# Patient Record
Sex: FEMALE | Race: WHITE | NOT HISPANIC OR LATINO | Employment: OTHER | URBAN - METROPOLITAN AREA
[De-identification: names, ages, dates, MRNs, and addresses within clinical notes are randomized per-mention and may not be internally consistent; named-entity substitution may affect disease eponyms.]

---

## 2023-07-24 LAB
GRAM STAIN: ABNORMAL
TISSUE/WOUND CULTURE/SMEAR: ABNORMAL
TISSUE/WOUND CULTURE/SMEAR: ABNORMAL

## 2023-09-07 PROBLEM — L60.0 ONYCHOCRYPTOSIS: Status: ACTIVE | Noted: 2023-09-07

## 2023-09-07 PROBLEM — S90.31XA CONTUSION OF RIGHT FOOT: Status: ACTIVE | Noted: 2023-09-07

## 2023-09-07 PROBLEM — R60.0 EDEMA OF FOOT: Status: ACTIVE | Noted: 2023-09-07

## 2023-09-07 PROBLEM — J45.909 ASTHMA (HHS-HCC): Status: ACTIVE | Noted: 2023-09-07

## 2023-09-07 PROBLEM — G62.9 PERIPHERAL NEUROPATHY: Status: ACTIVE | Noted: 2023-09-07

## 2023-09-07 PROBLEM — S90.32XA TRAUMATIC ECCHYMOSIS OF LEFT FOOT: Status: ACTIVE | Noted: 2023-09-07

## 2023-09-07 PROBLEM — M20.10 VALGUS DEFORMITY OF GREAT TOE: Status: ACTIVE | Noted: 2023-09-07

## 2023-09-07 PROBLEM — M79.671 FOOT PAIN, BILATERAL: Status: ACTIVE | Noted: 2023-09-07

## 2023-09-07 PROBLEM — B35.1 ONYCHOMYCOSIS: Status: ACTIVE | Noted: 2023-09-07

## 2023-09-07 PROBLEM — L03.119 CELLULITIS OF FOOT: Status: ACTIVE | Noted: 2023-09-07

## 2023-09-07 PROBLEM — L30.9 ECZEMA: Status: ACTIVE | Noted: 2023-09-07

## 2023-09-07 PROBLEM — L29.9 PRURITUS: Status: ACTIVE | Noted: 2023-09-07

## 2023-09-07 PROBLEM — I10 BENIGN ESSENTIAL HTN: Status: ACTIVE | Noted: 2023-09-07

## 2023-09-07 PROBLEM — L84 CORNS: Status: ACTIVE | Noted: 2023-09-07

## 2023-09-07 PROBLEM — K59.09 CHRONIC CONSTIPATION: Status: ACTIVE | Noted: 2023-09-07

## 2023-09-07 PROBLEM — M79.672 FOOT PAIN, BILATERAL: Status: ACTIVE | Noted: 2023-09-07

## 2023-09-07 RX ORDER — METOPROLOL TARTRATE 25 MG/1
1 TABLET, FILM COATED ORAL DAILY
COMMUNITY
Start: 2017-10-12

## 2023-09-07 RX ORDER — CEPHALEXIN 500 MG/1
500 CAPSULE ORAL 4 TIMES DAILY
COMMUNITY
End: 2023-09-08 | Stop reason: ENTERED-IN-ERROR

## 2023-09-07 RX ORDER — CLOBETASOL PROPIONATE 0.5 MG/G
OINTMENT TOPICAL 2 TIMES DAILY
COMMUNITY

## 2023-09-07 RX ORDER — AMLODIPINE BESYLATE 10 MG/1
TABLET ORAL
COMMUNITY
Start: 2018-12-03

## 2023-09-07 RX ORDER — MONTELUKAST SODIUM 10 MG/1
10 TABLET ORAL
COMMUNITY

## 2023-09-07 RX ORDER — TRIAMCINOLONE ACETONIDE 1 MG/G
CREAM TOPICAL
COMMUNITY
Start: 2023-07-12

## 2023-09-07 RX ORDER — ALBUTEROL SULFATE 90 UG/1
2 AEROSOL, METERED RESPIRATORY (INHALATION) EVERY 6 HOURS PRN
COMMUNITY

## 2023-09-07 RX ORDER — FLUTICASONE PROPIONATE AND SALMETEROL 500; 50 UG/1; UG/1
1 POWDER RESPIRATORY (INHALATION) 2 TIMES DAILY
COMMUNITY
Start: 2017-08-10

## 2023-09-07 RX ORDER — ATORVASTATIN CALCIUM 10 MG/1
10 TABLET, FILM COATED ORAL
COMMUNITY
Start: 2023-05-16

## 2023-09-07 RX ORDER — AMLODIPINE BESYLATE 5 MG/1
5 TABLET ORAL
COMMUNITY
Start: 2023-05-16

## 2023-09-07 RX ORDER — BENRALIZUMAB 30 MG/ML
INJECTION, SOLUTION SUBCUTANEOUS
COMMUNITY

## 2023-10-11 ENCOUNTER — OFFICE VISIT (OUTPATIENT)
Dept: PODIATRY | Facility: CLINIC | Age: 83
End: 2023-10-11
Payer: MEDICARE

## 2023-10-11 DIAGNOSIS — M79.671 PAIN IN BOTH FEET: Primary | ICD-10-CM

## 2023-10-11 DIAGNOSIS — M79.672 PAIN IN BOTH FEET: Primary | ICD-10-CM

## 2023-10-11 DIAGNOSIS — L60.0 INGROWN TOENAIL: ICD-10-CM

## 2023-10-11 DIAGNOSIS — M20.11 VALGUS DEFORMITY OF BOTH GREAT TOES: ICD-10-CM

## 2023-10-11 DIAGNOSIS — M20.12 VALGUS DEFORMITY OF BOTH GREAT TOES: ICD-10-CM

## 2023-10-11 DIAGNOSIS — B35.1 ONYCHOMYCOSIS: ICD-10-CM

## 2023-10-11 DIAGNOSIS — R26.89 ANTALGIC GAIT: ICD-10-CM

## 2023-10-11 PROCEDURE — 1159F MED LIST DOCD IN RCRD: CPT | Performed by: PODIATRIST

## 2023-10-11 PROCEDURE — 99213 OFFICE O/P EST LOW 20 MIN: CPT | Performed by: PODIATRIST

## 2023-10-11 PROCEDURE — 1160F RVW MEDS BY RX/DR IN RCRD: CPT | Performed by: PODIATRIST

## 2023-10-11 NOTE — PATIENT INSTRUCTIONS
Recommend wide shoes please consider getting something at [employer] that has a location [facility]

## 2023-10-11 NOTE — PROGRESS NOTES
Chief Complaint   Patient presents with    nail care     FARIDEH            Patient has the chief complaint of painful nails on both feet.  There is a history of either ingrown nails or other problems with the nails. This includes difficulty with ambulation and wearing shoes.  Previous ingrown nail is doing much better.  She does complain of bilateral foot pain secondary to hallux valgus deformity.  There are difficulty finding wide with shoes.  Also mild corn between first and second digits of the right foot. There are no other complaints at this time. No changes in past medical history.  ROS without change from prior.      General/Constitutional: Alert. NAD.   Respiratory: Non labored breathing.   Psychiatric: Mood and affect normal/baseline.   HEENT: Sclera clear. Wearing corrective lenses.  Dermatologic: Nails are hypertrophic crumbly and yellow.  Mild residual onychocryptosis with hallux nail painful to palpation. No acute inflammatory infectious process. Web spaces are dry. No ulcers no pre-ulcerative areas.  Mild soft corn between first and second digits right foot.  Vascular: Pedal pulses are intact and symmetric including the dorsalis pedis and the posterior tibial pulses. Feet are warm to touch.  Neck nonpitting edema appreciated..  Neurological: Alert and oriented. No acute distress. No sensory impairment bilateral.  Musculoskeletal: Strength is normal for age. No acute deficits appreciated.  Significant hallux valgus bilaterally.       Impression: Hallux valgus deformity bilaterally. Painful nail pathology as described.  Resolved contusion left foot.     -Discussed proper shoe gear.  Discussed interdigital padding between first and second digits of the right foot.  Soft padding discussed.     -Wider shoe gear recommended.  Information given regarding work issues.     -Today's treatment and course of therapy was discussed with the patient in detail. Patient's questions were answered. Proper foot care was  discussed. This dictation was done using Dragon computer software and as such may contain grammatical errors.     -Nail debridement was performed this was a greater than 6 nails. Nails were manually debrided. They were decreased and girth in length. Appropriate care was discussed. Preventative care was reviewed. Follow-up in about 2 months unless there is any other problems.

## 2023-10-25 ENCOUNTER — HOSPITAL ENCOUNTER (OUTPATIENT)
Dept: RADIOLOGY | Facility: HOSPITAL | Age: 83
Discharge: HOME | End: 2023-10-25
Payer: MEDICARE

## 2023-10-25 DIAGNOSIS — R06.02 SHORTNESS OF BREATH: ICD-10-CM

## 2023-10-25 PROCEDURE — 71046 X-RAY EXAM CHEST 2 VIEWS: CPT | Performed by: RADIOLOGY

## 2023-10-25 PROCEDURE — 71046 X-RAY EXAM CHEST 2 VIEWS: CPT | Mod: FY

## 2023-12-13 ENCOUNTER — OFFICE VISIT (OUTPATIENT)
Dept: PODIATRY | Facility: CLINIC | Age: 83
End: 2023-12-13
Payer: MEDICARE

## 2023-12-13 DIAGNOSIS — M20.11 VALGUS DEFORMITY OF BOTH GREAT TOES: ICD-10-CM

## 2023-12-13 DIAGNOSIS — M20.12 VALGUS DEFORMITY OF BOTH GREAT TOES: ICD-10-CM

## 2023-12-13 DIAGNOSIS — M79.671 PAIN IN BOTH FEET: Primary | ICD-10-CM

## 2023-12-13 DIAGNOSIS — L60.0 INGROWN TOENAIL: ICD-10-CM

## 2023-12-13 DIAGNOSIS — B35.1 ONYCHOMYCOSIS: ICD-10-CM

## 2023-12-13 DIAGNOSIS — R26.89 ANTALGIC GAIT: ICD-10-CM

## 2023-12-13 DIAGNOSIS — M79.672 PAIN IN BOTH FEET: Primary | ICD-10-CM

## 2023-12-13 PROCEDURE — 1159F MED LIST DOCD IN RCRD: CPT | Performed by: PODIATRIST

## 2023-12-13 PROCEDURE — 99213 OFFICE O/P EST LOW 20 MIN: CPT | Performed by: PODIATRIST

## 2023-12-13 PROCEDURE — 1160F RVW MEDS BY RX/DR IN RCRD: CPT | Performed by: PODIATRIST

## 2023-12-13 NOTE — PROGRESS NOTES
Chief Complaint   Patient presents with    nail care     FARIDEH      Patient has the chief complaint of painful nails on both feet.  There is a history of either ingrown nails or other problems with the nails. This includes difficulty with ambulation and wearing shoes.  Additionally she complains of a pressure between the first and second digits of the right foot.  Significant hallux valgus deformity.  Has not gotten wider shoes yet she describes forefoot discomfort because of shoe gear.  No changes past medical history review of systems.    General/Constitutional: Alert. NAD.   Respiratory: Non labored breathing.   Psychiatric: Mood and affect normal/baseline.   HEENT: Sclera clear. Wearing corrective lenses.  Dermatologic: Nails are hypertrophic crumbly and yellow.  Mild residual onychocryptosis with hallux nail painful to palpation. No acute inflammatory infectious process. Web spaces are dry. No ulcers no pre-ulcerative areas.  Mild soft corn between first and second digits right foot.  This is without any evidence of infection.  Vascular: Pedal pulses are intact and symmetric including the dorsalis pedis and the posterior tibial pulses. Feet are warm to touch.  Chronic nonpitting edema appreciated.  Neurological: Alert and oriented. No acute distress. No sensory impairment bilateral.  Musculoskeletal: Strength is normal for age. No acute deficits appreciated.  Significant hallux valgus bilaterally.     Impression: Hallux valgus deformity bilaterally. Painful nail pathology as described.      -Okay to use padding between the first and second digits of the right foot.     -Still recommend wider shoe gear.  Please get Pap for yourself.    -Seasonal footcare discussed.     -Today's treatment and course of therapy was discussed with the patient in detail. Patient's questions were answered. Proper foot care was discussed. This dictation was done using Dragon computer software and as such may contain grammatical  errors.     -Nail debridement was performed this was a greater than 6 nails. Nails were manually debrided. They were decreased and girth in length. Appropriate care was discussed. Preventative care was reviewed. Follow-up in about 2 months unless there is any other problems.

## 2024-03-06 ENCOUNTER — PROCEDURE VISIT (OUTPATIENT)
Dept: PODIATRY | Facility: CLINIC | Age: 84
End: 2024-03-06
Payer: MEDICARE

## 2024-03-06 DIAGNOSIS — M20.12 VALGUS DEFORMITY OF BOTH GREAT TOES: ICD-10-CM

## 2024-03-06 DIAGNOSIS — M79.672 PAIN IN BOTH FEET: Primary | ICD-10-CM

## 2024-03-06 DIAGNOSIS — B35.1 ONYCHOMYCOSIS: ICD-10-CM

## 2024-03-06 DIAGNOSIS — L60.0 INGROWN TOENAIL: ICD-10-CM

## 2024-03-06 DIAGNOSIS — M79.671 PAIN IN BOTH FEET: Primary | ICD-10-CM

## 2024-03-06 DIAGNOSIS — M20.11 VALGUS DEFORMITY OF BOTH GREAT TOES: ICD-10-CM

## 2024-03-06 PROCEDURE — 99213 OFFICE O/P EST LOW 20 MIN: CPT | Performed by: PODIATRIST

## 2024-03-06 NOTE — PROGRESS NOTES
Chief Complaint   Patient presents with    nail care     Patient is here today for nail care      Patient has the chief complaint of painful nails on both feet.  There is a history of either ingrown nails or other problems with the nails. This includes difficulty with ambulation and wearing shoes.  Left hallux medial border feels ingrown.  Additionally she complains of a pressure between the first and second digits of the right foot.  This is baseline attempted to pad it.  Significant hallux valgus deformity.  He is now wearing wider shoes.  No changes past medical history review of systems.    General/Constitutional: Alert. NAD.   Respiratory: Non labored breathing.   Psychiatric: Mood and affect normal/baseline.   HEENT: Sclera clear. Wearing corrective lenses.  Dermatologic: Nails are hypertrophic crumbly and yellow.  Mild residual onychocryptosis with left hallux nail painful to palpation. No acute inflammatory infectious process. Web spaces are dry. No ulcers no pre-ulcerative areas.  Soft corn/stage I pressure area between first and second digits right foot.  This is without any evidence of infection.  Vascular: Pedal pulses are intact and symmetric including the dorsalis pedis and the posterior tibial pulses. Feet are warm to touch.  Chronic nonpitting edema appreciated.  Neurological: Alert and oriented. No acute distress. No sensory impairment bilateral.  Musculoskeletal: Strength is normal for age. No acute deficits appreciated.  Significant hallux valgus bilaterally.     Impression: Hallux valgus deformity bilaterally. Painful nail pathology as described.      -Continue wider shoe gear.  Protective padding.    -Seasonal footcare discussed.     -Today's treatment and course of therapy was discussed with the patient in detail. Patient's questions were answered. Proper foot care was discussed. This dictation was done using Dragon computer software and as such may contain grammatical errors.     -Nail  debridement was performed this was a greater than 6 nails. Nails were manually debrided. They were decreased and girth in length. Appropriate care was discussed. Preventative care was reviewed. Follow-up in about 2 months unless there is any other problems.    -Superficial paring ulcerated area second digit right foot.  Padding protect discussed.  Toe spacer with be beneficial.  Do the same in the left foot.

## 2024-05-08 ENCOUNTER — OFFICE VISIT (OUTPATIENT)
Dept: PODIATRY | Facility: CLINIC | Age: 84
End: 2024-05-08
Payer: MEDICARE

## 2024-05-08 DIAGNOSIS — M77.42 METATARSALGIA OF LEFT FOOT: ICD-10-CM

## 2024-05-08 DIAGNOSIS — M79.671 PAIN IN BOTH FEET: ICD-10-CM

## 2024-05-08 DIAGNOSIS — L84 CORNS AND CALLOSITIES: Primary | ICD-10-CM

## 2024-05-08 DIAGNOSIS — M20.41 HAMMERTOES OF BOTH FEET: ICD-10-CM

## 2024-05-08 DIAGNOSIS — M79.672 PAIN IN BOTH FEET: ICD-10-CM

## 2024-05-08 DIAGNOSIS — M20.42 HAMMERTOES OF BOTH FEET: ICD-10-CM

## 2024-05-08 PROCEDURE — 1160F RVW MEDS BY RX/DR IN RCRD: CPT | Performed by: PODIATRIST

## 2024-05-08 PROCEDURE — 1159F MED LIST DOCD IN RCRD: CPT | Performed by: PODIATRIST

## 2024-05-08 PROCEDURE — 99214 OFFICE O/P EST MOD 30 MIN: CPT | Performed by: PODIATRIST

## 2024-05-08 PROCEDURE — 1036F TOBACCO NON-USER: CPT | Performed by: PODIATRIST

## 2024-05-08 RX ORDER — FLUTICASONE FUROATE, UMECLIDINIUM BROMIDE AND VILANTEROL TRIFENATATE 200; 62.5; 25 UG/1; UG/1; UG/1
1 POWDER RESPIRATORY (INHALATION) DAILY
COMMUNITY
Start: 2024-04-10

## 2024-05-08 NOTE — PROGRESS NOTES
Chief Complaint   Patient presents with    corn     Patient is here today for a corn 2nd toe right foot. Started 2 weeks ago.        HPI: Patient is complaining of a painful callus on the second right digit.  She typically wears a gel spacer.  She says that the spacer slips out of the interspace.  Patient prefers the foam spacers we have in the office.  Patient is also complaining of pain on the ball of the foot of the left foot.  Denies any recent trauma.  Last seen by Dr. Singh 3/2024      Physical Exam  Patient alert, oriented, no acute distress     VASC: +2/4 DP B/L, no PT pulses B/L secondary to edema. CFT brisk all digits. Feet warm to touch. (+)hair growth B/L. Mild LE edema B/L and multiple varicosities.      NEURO: Vibratory absent L, diminished R 1st MPJ.  Light touch intact B/L.   5.07 [place]-Manny monofilament intact B/L.     DERM: Pre-ulcerative, painful, hyperkeratotic lesion located:medial 2nd R digit, lateral R hallux, medial PIPJ R hallux, medial 1st met head R. No cellulitis noted.     MUSCULOSKEL: +5/5 muscle strength B/L.   HAV noted B/L.  There is pain upon palpation on the plantar aspect of the 2nd and 3rd met heads L  Mild second interspace pain on palpation L   Hammertoes 2-5 B/L noted.   No pain with lateral compression of metatarsal heads L  No audible click noted L  Pes planus noted B/L    Assessment and plan  #1 calluses  Dispensed foam spacer  Paring of all hyperkeratotic tissue with a 15 blade without complications  Follow-up as needed    #2 metatarsalgia left foot  secondary to HAV and hammertoes  Rx:topical combination cream  Dispensed metatarsal pads  Follow-up as needed

## 2024-06-13 ENCOUNTER — HOSPITAL ENCOUNTER (OUTPATIENT)
Dept: RADIOLOGY | Facility: CLINIC | Age: 84
Discharge: HOME | End: 2024-06-13
Payer: MEDICARE

## 2024-06-13 VITALS — HEIGHT: 64 IN | WEIGHT: 190 LBS | BODY MASS INDEX: 32.44 KG/M2

## 2024-06-13 DIAGNOSIS — Z12.31 VISIT FOR SCREENING MAMMOGRAM: ICD-10-CM

## 2024-06-13 PROCEDURE — 77063 BREAST TOMOSYNTHESIS BI: CPT

## 2024-06-13 PROCEDURE — 77063 BREAST TOMOSYNTHESIS BI: CPT | Performed by: RADIOLOGY

## 2024-06-13 PROCEDURE — 77067 SCR MAMMO BI INCL CAD: CPT | Performed by: RADIOLOGY

## 2024-08-15 ENCOUNTER — OFFICE VISIT (OUTPATIENT)
Dept: PODIATRY | Facility: CLINIC | Age: 84
End: 2024-08-15
Payer: MEDICARE

## 2024-08-15 DIAGNOSIS — M79.671 PAIN IN BOTH FEET: ICD-10-CM

## 2024-08-15 DIAGNOSIS — M79.672 PAIN IN BOTH FEET: ICD-10-CM

## 2024-08-15 DIAGNOSIS — B35.1 ONYCHOMYCOSIS: ICD-10-CM

## 2024-08-15 DIAGNOSIS — L84 CORNS AND CALLOSITIES: Primary | ICD-10-CM

## 2024-08-15 PROCEDURE — 1159F MED LIST DOCD IN RCRD: CPT | Performed by: PODIATRIST

## 2024-08-15 PROCEDURE — 1160F RVW MEDS BY RX/DR IN RCRD: CPT | Performed by: PODIATRIST

## 2024-08-15 PROCEDURE — 1036F TOBACCO NON-USER: CPT | Performed by: PODIATRIST

## 2024-08-15 PROCEDURE — 99213 OFFICE O/P EST LOW 20 MIN: CPT | Performed by: PODIATRIST

## 2024-08-15 NOTE — PROGRESS NOTES
Chief Complaint   Patient presents with    nail care     Patient is here today for nail care and calluses        HPI: Patient is complaining of a painful calluses and painful nails that limit walking.  She is unable to reach her toes and trim her thick toenails herself.  She typically wears a gel spacer.  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  Her  has Parkinson's and her daughter is now on hospice.    Physical Exam  Patient alert, oriented, no acute distress     VASC: +2/4 DP B/L, no PT pulses B/L secondary to edema. CFT brisk all digits. Feet warm to touch. (+)hair growth B/L.  Moderate amount LE edema B/L and multiple varicosities.  He has increased since last visit     NEURO: Vibratory absent L, diminished R 1st MPJ.  Light touch intact B/L.   5.07 [place]-Manny monofilament intact B/L.     DERM: Pre-ulcerative, painful, hyperkeratotic lesion located:medial 2nd R digit, lateral R hallux, medial PIPJ R+L hallux, medial 1st met head R+L. No cellulitis noted.  Nails 1-5 bilaterally are thick, yellow, crumbly, painful and have subungual debris and elongated.     MUSCULOSKEL: +5/5 muscle strength B/L.   HAV noted B/L.  Pes planus noted B/L    Assessment and plan  #1 calluses  Continue with spacers  Paring of all hyperkeratotic tissue with a #15 blade without complications  Follow-up as needed    #2  Onychomycosis  Discussed findings in detail  Debrided all nails in length and thickness.  Patient to follow-up in 2 months  Proper footcare reviewed

## 2024-09-25 ENCOUNTER — HOSPITAL ENCOUNTER (OUTPATIENT)
Dept: RADIOLOGY | Facility: CLINIC | Age: 84
Discharge: HOME | End: 2024-09-25
Payer: MEDICARE

## 2024-09-25 VITALS — HEIGHT: 64 IN | WEIGHT: 190 LBS | BODY MASS INDEX: 32.44 KG/M2

## 2024-09-25 DIAGNOSIS — R92.8 FOLLOW-UP EXAMINATION OF ABNORMAL MAMMOGRAM: ICD-10-CM

## 2024-09-25 PROCEDURE — 76642 ULTRASOUND BREAST LIMITED: CPT | Mod: RIGHT SIDE | Performed by: RADIOLOGY

## 2024-09-25 PROCEDURE — 77065 DX MAMMO INCL CAD UNI: CPT | Mod: RIGHT SIDE | Performed by: RADIOLOGY

## 2024-09-25 PROCEDURE — 77061 BREAST TOMOSYNTHESIS UNI: CPT | Mod: RT

## 2024-09-25 PROCEDURE — 76642 ULTRASOUND BREAST LIMITED: CPT | Mod: RT

## 2024-09-25 PROCEDURE — 76982 USE 1ST TARGET LESION: CPT | Mod: RT

## 2024-09-25 PROCEDURE — G0279 TOMOSYNTHESIS, MAMMO: HCPCS | Mod: RIGHT SIDE | Performed by: RADIOLOGY

## 2025-03-17 ENCOUNTER — PROCEDURE VISIT (OUTPATIENT)
Dept: PODIATRY | Facility: CLINIC | Age: 85
End: 2025-03-17
Payer: MEDICARE

## 2025-03-17 DIAGNOSIS — M79.672 PAIN IN BOTH FEET: Primary | ICD-10-CM

## 2025-03-17 DIAGNOSIS — M20.42 HAMMERTOES OF BOTH FEET: ICD-10-CM

## 2025-03-17 DIAGNOSIS — B35.1 ONYCHOMYCOSIS: ICD-10-CM

## 2025-03-17 DIAGNOSIS — L84 CORNS AND CALLOSITIES: ICD-10-CM

## 2025-03-17 DIAGNOSIS — M79.671 PAIN IN BOTH FEET: Primary | ICD-10-CM

## 2025-03-17 DIAGNOSIS — M20.11 VALGUS DEFORMITY OF BOTH GREAT TOES: ICD-10-CM

## 2025-03-17 DIAGNOSIS — M20.41 HAMMERTOES OF BOTH FEET: ICD-10-CM

## 2025-03-17 DIAGNOSIS — M20.12 VALGUS DEFORMITY OF BOTH GREAT TOES: ICD-10-CM

## 2025-03-17 PROCEDURE — 99213 OFFICE O/P EST LOW 20 MIN: CPT | Performed by: PODIATRIST

## 2025-03-17 NOTE — PROGRESS NOTES
Chief Complaint   Patient presents with    Follow-up     NAIL CARE       Chief Complaint   Patient presents with    Follow-up     NAIL CARE      Patient has the chief complaint of painful nails on both feet.  There is a history of either ingrown nails or other problems with the nails. This includes difficulty with ambulation and wearing shoes.  At this time no more evidence of ingrown nail.  Pressure areas between the digits are improved.  Bunions are stable.  She has been wearing wider shoe gear.  She recently saw allergy medicine for persistent severe asthma..  No changes past medical history review of systems.    General/Constitutional: Alert. NAD.   Respiratory: Non labored breathing.   Psychiatric: Mood and affect normal/baseline.   HEENT: Sclera clear. Wearing corrective lenses.  Dermatologic: Nails are hypertrophic crumbly and yellow.  No onychocryptosis at this time.  Positive subungual debris beneath multiple nails.  No acute inflammatory infectious process. Web spaces are dry. No ulcers no pre-ulcerative areas.  S pressure between the digits has resolved on the right foot 1 and 2 vascular: Pedal pulses are intact and symmetric including the dorsalis pedis and the posterior tibial pulses. Feet are warm to touch.  Chronic nonpitting edema appreciated.  Neurological: Alert and oriented. No acute distress. No sensory impairment bilateral.  Musculoskeletal: Strength is normal for age. No acute deficits appreciated.  Significant hallux valgus bilaterally.  Hammertoe deformity.  These are asymptomatic     Impression: Hallux valgus deformity bilaterally.  Hammertoes.  Painful nail pathology as described.      -Continue wider shoe gear.  Protective padding.    -Seasonal footcare discussed.     -Today's treatment and course of therapy was discussed with the patient in detail. Patient's questions were answered. Proper foot care was discussed. This dictation was done using Dragon computer software and as such may  contain grammatical errors.     -Nail debridement was performed this was a greater than 6 nails. Nails were manually debrided. They were decreased and girth in length. Appropriate care was discussed. Preventative care was reviewed. Follow-up in about 2 months unless there is any other problems.    -No new labs to review.    -Allergy medicine note reviewed

## 2025-05-19 ENCOUNTER — CLINICAL SUPPORT (OUTPATIENT)
Dept: AUDIOLOGY | Facility: CLINIC | Age: 85
End: 2025-05-19
Payer: MEDICARE

## 2025-05-19 DIAGNOSIS — H90.A22 SENSORINEURAL HEARING LOSS (SNHL) OF LEFT EAR WITH RESTRICTED HEARING OF RIGHT EAR: ICD-10-CM

## 2025-05-19 DIAGNOSIS — H90.A31 MIXED CONDUCTIVE AND SENSORINEURAL HEARING LOSS OF RIGHT EAR WITH RESTRICTED HEARING OF LEFT EAR: Primary | ICD-10-CM

## 2025-05-19 DIAGNOSIS — R26.89 IMBALANCE: ICD-10-CM

## 2025-05-19 PROCEDURE — 92557 COMPREHENSIVE HEARING TEST: CPT | Performed by: AUDIOLOGIST

## 2025-05-19 PROCEDURE — 92550 TYMPANOMETRY & REFLEX THRESH: CPT | Mod: 52 | Performed by: AUDIOLOGIST

## 2025-05-19 NOTE — PROGRESS NOTES
"AUDIOLOGY ADULT AUDIOMETRIC EVALUATION      Name:  Imelda Liu  :  1940  Age:  84 y.o.  Date of Evaluation:  2025    HISTORY  Reason for visit:  hearing loss  Ms. Liu is seen 2025 for an evaluation of hearing.      Chief complaint:    Has had some recent earwax and left outer ear itching    Hearing loss:  - since at least her 70s; uses the right ear for phone  Tinnitus:   occasional, unsure of side   Otitis Media: denies  Otologic surgical history:  denies  Dizziness/imbalance:  imbalance; dizziness/\"like a wave\"   - treated with prednisone since the end of February for infection in muscles; wonders if this may be contributing  Otalgia:  denies  Ear pressure/fullness:  denies  History of excessive noise exposure:  denies  Other: mother had hearing loss, used hearing aids starting at least in middle age     Hearing aid history: uses bilateral hearing aids, about 7-8 years, with minimal benefit; has a lot of hearing difficulty even with the hearing aids   - fit issue with right hearing aid and does not use it  - Oticon Opn 3 miniRITE; right 8 mm open dome; left 8mm single vent (changed both to bass-dome, double-vent today)         EVALUATION  Please find audiogram in \"Media\" tab (Document Type:  Audiology Report) or included at the bottom of this note.    RESULTS   Otoscopic Evaluation: minimal cerumen bilaterally     Immittance Measures (226 Hz probe tone):   Tympanometry is consistent with normal middle ear pressure and normal tympanic membrane mobility bilaterally.       Ipsilateral acoustic reflexes (500-4000 Hz) are absent for the right ear and present for the left ear for 500 Hz (absent 8977-1154 Hz).       Test technique:  standard behavioral technique via insert earphones (checked with [employer] earphones).  Reliability is good.    Pure Tone Audiometry:  Hearing sensitivity is in the mild to severe hearing loss range bilaterally.  Right hearing loss is mixed (note right air-bone gap for " 4000 Hz); left hearing loss is sensorineural.     Speech Audiometry:        Right Ear:  Speech Reception Threshold (SRT) was obtained at 60 dBHL                 Speech discrimination score was 64% in quiet when words were presented at 100 dBHL      Left Ear:  Speech Reception Threshold (SRT) was obtained at 60 dBHL                 Speech discrimination score was 44% in quiet when words were presented at 100 dBHL    Hearing aid: Hearing aid was updated to today's hearing test.      IMPRESSIONS:  Patient is expected to experience communication difficulty in all listening situations.   Patient is expected to benefit from amplification; benefit from amplification, however, will be limited by fair/poor speech discrimination.  If sufficient benefit cannot be obtained from amplification, cochlear implant evaluation may be considered to determine if patient would obtain additional benefit from cochlear implant.        RECOMMENDATIONS  Continue with hearing aid use.   If current aid cannot be optimized for current hearing loss, consider new amplification.    Reassess hearing in 1 year (or sooner if medically indicated or if there is a concern for a change in hearing).    Continue with medical follow-up as indicated.       PATIENT EDUCATION  Discussed results and recommendations with patient.  Questions were addressed and the patient was encouraged to contact our department should concerns arise.       BORIS Serna, [facility]  Licensed Audiologist

## 2025-05-20 ENCOUNTER — OFFICE VISIT (OUTPATIENT)
Dept: PODIATRY | Facility: CLINIC | Age: 85
End: 2025-05-20
Payer: MEDICARE

## 2025-05-20 DIAGNOSIS — M20.42 HAMMERTOES OF BOTH FEET: ICD-10-CM

## 2025-05-20 DIAGNOSIS — M20.12 VALGUS DEFORMITY OF BOTH GREAT TOES: ICD-10-CM

## 2025-05-20 DIAGNOSIS — L84 CORNS AND CALLOSITIES: ICD-10-CM

## 2025-05-20 DIAGNOSIS — M79.671 PAIN IN BOTH FEET: Primary | ICD-10-CM

## 2025-05-20 DIAGNOSIS — M20.41 HAMMERTOES OF BOTH FEET: ICD-10-CM

## 2025-05-20 DIAGNOSIS — B35.1 ONYCHOMYCOSIS: ICD-10-CM

## 2025-05-20 DIAGNOSIS — M79.672 PAIN IN BOTH FEET: Primary | ICD-10-CM

## 2025-05-20 DIAGNOSIS — M77.42 METATARSALGIA OF LEFT FOOT: ICD-10-CM

## 2025-05-20 DIAGNOSIS — M20.11 VALGUS DEFORMITY OF BOTH GREAT TOES: ICD-10-CM

## 2025-05-20 PROCEDURE — 1160F RVW MEDS BY RX/DR IN RCRD: CPT | Performed by: PODIATRIST

## 2025-05-20 PROCEDURE — 99213 OFFICE O/P EST LOW 20 MIN: CPT | Performed by: PODIATRIST

## 2025-05-20 PROCEDURE — 1036F TOBACCO NON-USER: CPT | Performed by: PODIATRIST

## 2025-05-20 PROCEDURE — 1159F MED LIST DOCD IN RCRD: CPT | Performed by: PODIATRIST

## 2025-05-20 NOTE — PROGRESS NOTES
Chief Complaint   Patient presents with    Follow-up     NAIL CARE, BALL OF FOOT PAIN BILATERAL       Chief Complaint   Patient presents with    Follow-up     NAIL CARE, BALL OF FOOT PAIN BILATERAL      Patient has the chief complaint of painful nails on both feet.  There is a history of either ingrown nails or other problems with the nails. This includes difficulty with ambulation and wearing shoes.  Previously left ingrown nail border feels better.  Fracture between 1st and 2nd digits improved.  This is baseline attempted to pad it.  Significant hallux valgus deformity.  He is now wearing wider shoes.  No changes past medical history review of systems.    General/Constitutional: Alert. NAD.   Respiratory: Non labored breathing.   Psychiatric: Mood and affect normal/baseline.   HEENT: Sclera clear. Wearing corrective lenses.  Dermatologic: She has baseline nails are hypertrophic crumbly and yellow.  No gross proptosis 2nd and 4th digits of the left foot.  No acute inflammatory infectious process. Web spaces are dry. No ulcers no pre-ulcerative areas.  Soft corn between 1st and 2nd digits right foot asymptomatic.  This is without any evidence of infection.  Vascular: Pedal pulses are intact and symmetric including the dorsalis pedis and the posterior tibial pulses. Feet are warm to touch.  Chronic nonpitting edema appreciated.  Neurological: Alert and oriented. No acute distress. No sensory impairment bilateral.  Musculoskeletal: Strength is normal for age. No acute deficits appreciated.  Significant hallux valgus bilaterally.     Impression: Painful nail pathology complicated by hallux valgus deformity.       -Continue wider shoe gear.  Protective padding.    -Seasonal footcare discussed.     -Today's treatment and course of therapy was discussed with the patient in detail. Patient's questions were answered. Proper foot care was discussed. This dictation was done using Dragon computer software and as such may  contain grammatical errors.  Patient is standing current findings and course     -Nail debridement was performed this was a greater than 6 nails. Nails were manually debrided. They were decreased and girth in length. Appropriate care was discussed. Preventative care was reviewed. Follow-up in about 2 months unless there is any other problems.    -Superficial paring ulcerated area second digit right foot.  Padding protect discussed.  Toe spacer with be beneficial.  Do the same in the left foot.

## 2025-05-22 ENCOUNTER — CLINICAL SUPPORT (OUTPATIENT)
Dept: AUDIOLOGY | Facility: CLINIC | Age: 85
End: 2025-05-22

## 2025-05-22 DIAGNOSIS — H90.A31 MIXED CONDUCTIVE AND SENSORINEURAL HEARING LOSS OF RIGHT EAR WITH RESTRICTED HEARING OF LEFT EAR: ICD-10-CM

## 2025-05-22 DIAGNOSIS — H90.A22 SENSORINEURAL HEARING LOSS (SNHL) OF LEFT EAR WITH RESTRICTED HEARING OF RIGHT EAR: Primary | ICD-10-CM

## 2025-05-22 PROCEDURE — V5160 DISPENSING FEE BINAURAL: HCPCS | Mod: AUDSP | Performed by: AUDIOLOGIST

## 2025-05-22 PROCEDURE — V5261 HEARING AID, DIGIT, BIN, BTE: HCPCS | Mod: AUDSP | Performed by: AUDIOLOGIST

## 2025-05-22 NOTE — PROGRESS NOTES
Hearing Aid Evaluation    Imelda Liu, a 85 y.o.-old woman, was seen today for a Hearing Aid Evaluation.      She  Experiences bilateral, essentially sensorineural hearing loss.  Speech discrimination scores are fair to poor (right, 64%; left, 44%).      She has used hearing aids before.      Phonak Audeo I90-R Sphere hearing aids were demonstrated.  Patient reported different sound quality than current aids.       Patient will pay for bilateral entry-level/budget-level aids tomorrow ( staff not available to take payment today).      Recommendation:  Yovany L70-RT, color P1, length 3 R & L receivers   Return for fit in at least 2 weeks

## 2025-06-10 ENCOUNTER — CLINICAL SUPPORT (OUTPATIENT)
Dept: AUDIOLOGY | Facility: CLINIC | Age: 85
End: 2025-06-10
Payer: MEDICARE

## 2025-06-10 DIAGNOSIS — H90.A22 SENSORINEURAL HEARING LOSS (SNHL) OF LEFT EAR WITH RESTRICTED HEARING OF RIGHT EAR: Primary | ICD-10-CM

## 2025-06-10 DIAGNOSIS — H90.A31 MIXED CONDUCTIVE AND SENSORINEURAL HEARING LOSS OF RIGHT EAR WITH RESTRICTED HEARING OF LEFT EAR: ICD-10-CM

## 2025-06-10 NOTE — PROGRESS NOTES
Hearing Aid Fitting  Imelda Liu was seen today for a hearing aid fitting    Bilateral Phonak Audeo L70-RT, fit today with REM (6/10/2025)  RSN [id number]; LSN [id number]  3M receivers, small power domes  Repair, loss/damage warranty through 7/2/2025  Gain set to 90% for comfort    Otoscopic inspection showed: Right Ear Clear canals and tympanic membranes were visualized                     Left Ear Clear canals and tympanic membranes were visualized                     Aids coupled via: domes  Aids were set to a target gain of 90 %  Fitting formula: other: Phonak proprietary  Feedback manager was run today.   Battery Size:  rechargeable  10A.   Summary of Patient Education: The hearing aids are a good physical fit  Instructed on care and use of the rechargeable battery system   Instructed on care and use of the hearing aids   Written manual and user guide provided  Practiced insertion and removal of hearing aids  Warranty information, loss and damage protocol, and the trial period were explained  Out-of-pocket costs for ongoing maintenance and programming of the aids after the initial 90 day period was reviewed  Purchase agreement was signed and dated by: patient and clinician  Objective hearing aid verification performed     Patient is used to wearing a hearing aid on the left only.  She notes that when wearing bilateral hearing aids, it seems like sound is mainly from the right.  When wearing left-only, loudness seemed appropriate.      Gain was adjusted to 90% for comfort.  Patient was able to adjust VC to make own voice more comfortable.      Rec.: hearing aid check in 2-4 weeks  Continue with medical follow-up as indicated.

## 2025-07-01 ENCOUNTER — APPOINTMENT (OUTPATIENT)
Dept: AUDIOLOGY | Facility: CLINIC | Age: 85
End: 2025-07-01
Payer: MEDICARE

## 2025-07-01 ENCOUNTER — CLINICAL SUPPORT (OUTPATIENT)
Dept: AUDIOLOGY | Facility: CLINIC | Age: 85
End: 2025-07-01
Payer: MEDICARE

## 2025-07-01 DIAGNOSIS — H90.A22 SENSORINEURAL HEARING LOSS (SNHL) OF LEFT EAR WITH RESTRICTED HEARING OF RIGHT EAR: Primary | ICD-10-CM

## 2025-07-01 DIAGNOSIS — H90.A31 MIXED CONDUCTIVE AND SENSORINEURAL HEARING LOSS OF RIGHT EAR WITH RESTRICTED HEARING OF LEFT EAR: ICD-10-CM

## 2025-07-01 NOTE — PROGRESS NOTES
Hearing Aid Check    Bilateral Phonak Audeo L70-RT, fit 6/10/2025 with REM  RSN [id number]; LSN [id number]  3M receivers, right small vented dome (changed from [employer] power today for ease of insertion/retention); left small power domes  Repair, loss/damage warranty through 7/2/2025  1-beep program: acoustic phone, right side for phone    - right feedback  - no left feedback    - aids are not staying in well; right aid is coming out all the time     - diffculty on home phone     - a lot of echo on TV; difficulty understanding TV    - Discussed that feedback may be different in each ear; patient may rub mics to check that aids are functional.    - Changed right to small vented dome (discussed custom mold if needed    - Discussed positioning of phone; discussed benefit of Bluetooth connection on cell phone; created (1-beep) acoustic phone program and reviewed its use; patient practiced    Patient reported better sound quality with right vented dome (she is not used to using a right aid).  Patient will try this.      Rec.:  Hearing Aid Check in 2-4 weeks (or sooner if needed).

## 2025-07-15 ENCOUNTER — CLINICAL SUPPORT (OUTPATIENT)
Dept: AUDIOLOGY | Facility: CLINIC | Age: 85
End: 2025-07-15
Payer: MEDICARE

## 2025-07-15 DIAGNOSIS — H90.A22 SENSORINEURAL HEARING LOSS (SNHL) OF LEFT EAR WITH RESTRICTED HEARING OF RIGHT EAR: Primary | ICD-10-CM

## 2025-07-15 DIAGNOSIS — H90.A31 MIXED CONDUCTIVE AND SENSORINEURAL HEARING LOSS OF RIGHT EAR WITH RESTRICTED HEARING OF LEFT EAR: ICD-10-CM

## 2025-07-15 NOTE — PROGRESS NOTES
Hearing aid check    Bilateral Phonak Audeo L70-RT, fit 6/10/2025 with REM  RSN [id number]; LSN [id number]  3M receivers, right small vented dome with retention tab (added tab today for improved retention; changed from [employer] power 7/1/2025 for ease of insertion/retention); left small power domes  Repair, loss/damage warranty through 7/2/2028  Phone program was attempted without benefit    Right dome is not staying in; added retention tab  Difficulty hearing what is on TV  Not able to use phone program, does not want to try    Patient will try this and return as needed.

## 2025-07-21 ENCOUNTER — HOSPITAL ENCOUNTER (OUTPATIENT)
Dept: RADIOLOGY | Facility: HOSPITAL | Age: 85
Discharge: HOME | End: 2025-07-21
Payer: MEDICARE

## 2025-07-21 DIAGNOSIS — R06.02 SOB (SHORTNESS OF BREATH): ICD-10-CM

## 2025-07-21 PROCEDURE — 71046 X-RAY EXAM CHEST 2 VIEWS: CPT | Performed by: RADIOLOGY

## 2025-07-21 PROCEDURE — 71046 X-RAY EXAM CHEST 2 VIEWS: CPT

## 2025-07-29 ENCOUNTER — OFFICE VISIT (OUTPATIENT)
Dept: PODIATRY | Facility: CLINIC | Age: 85
End: 2025-07-29
Payer: MEDICARE

## 2025-07-29 ENCOUNTER — HOSPITAL ENCOUNTER (OUTPATIENT)
Dept: RADIOLOGY | Facility: CLINIC | Age: 85
Discharge: HOME | End: 2025-07-29
Payer: MEDICARE

## 2025-07-29 DIAGNOSIS — M79.671 RIGHT FOOT PAIN: Primary | ICD-10-CM

## 2025-07-29 DIAGNOSIS — M79.671 RIGHT FOOT PAIN: ICD-10-CM

## 2025-07-29 DIAGNOSIS — L60.0 INGROWN TOENAIL: ICD-10-CM

## 2025-07-29 DIAGNOSIS — M20.12 VALGUS DEFORMITY OF BOTH GREAT TOES: ICD-10-CM

## 2025-07-29 DIAGNOSIS — S90.121A CONTUSION OF LESSER TOE OF RIGHT FOOT WITHOUT DAMAGE TO NAIL, INITIAL ENCOUNTER: ICD-10-CM

## 2025-07-29 DIAGNOSIS — M20.41 HAMMERTOES OF BOTH FEET: ICD-10-CM

## 2025-07-29 DIAGNOSIS — B35.1 ONYCHOMYCOSIS: ICD-10-CM

## 2025-07-29 DIAGNOSIS — M20.42 HAMMERTOES OF BOTH FEET: ICD-10-CM

## 2025-07-29 DIAGNOSIS — M20.11 VALGUS DEFORMITY OF BOTH GREAT TOES: ICD-10-CM

## 2025-07-29 DIAGNOSIS — M79.672 PAIN IN BOTH FEET: ICD-10-CM

## 2025-07-29 DIAGNOSIS — M79.671 PAIN IN BOTH FEET: ICD-10-CM

## 2025-07-29 PROCEDURE — 1036F TOBACCO NON-USER: CPT | Performed by: PODIATRIST

## 2025-07-29 PROCEDURE — 1160F RVW MEDS BY RX/DR IN RCRD: CPT | Performed by: PODIATRIST

## 2025-07-29 PROCEDURE — 99214 OFFICE O/P EST MOD 30 MIN: CPT | Mod: 25 | Performed by: PODIATRIST

## 2025-07-29 PROCEDURE — 99214 OFFICE O/P EST MOD 30 MIN: CPT | Performed by: PODIATRIST

## 2025-07-29 PROCEDURE — 11721 DEBRIDE NAIL 6 OR MORE: CPT | Performed by: PODIATRIST

## 2025-07-29 PROCEDURE — 73630 X-RAY EXAM OF FOOT: CPT | Mod: RT

## 2025-07-29 PROCEDURE — 73630 X-RAY EXAM OF FOOT: CPT | Mod: RIGHT SIDE | Performed by: RADIOLOGY

## 2025-07-29 PROCEDURE — 1159F MED LIST DOCD IN RCRD: CPT | Performed by: PODIATRIST

## 2025-07-29 ASSESSMENT — ENCOUNTER SYMPTOMS
OCCASIONAL FEELINGS OF UNSTEADINESS: 1
LOSS OF SENSATION IN FEET: 0
DEPRESSION: 0

## 2025-07-29 NOTE — PROGRESS NOTES
Chief Complaint   Patient presents with    Follow-up     NAIL CARE, R FOOT TOES ARE BLACK AND BLUE       Chief Complaint   Patient presents with    Follow-up     NAIL CARE, R FOOT TOES ARE BLACK AND BLUE       Chief Complaint   Patient presents with    Follow-up     NAIL CARE, R FOOT TOES ARE BLACK AND BLUE   Established patient with chief complaint of painful nails on both feet.  History of problems with the nails tender to touch difficulty walking difficulty cutting.  Additionally left great toenail medial border feels ingrown.  Additionally about 2 to 3 weeks ago she fell injuring her right foot.  She has bruising and pain 2nd and 3rd digits.  It is improved from what it was.  She is ambulating with a cane.  That time she thinks she lost her balance.    General/Constitutional: Alert. NAD.   Respiratory: Non labored breathing.   Psychiatric: Mood and affect normal/baseline.   Dermatologic: Painful bilateral onychomycosis.  There is onychocryptosis with the left hallux medial border.  All are painful to palpation.  No acute inflammatory infectious process. Web spaces are dry. No ulcers no pre-ulcerative areas.  Minimally evident soft corn between 1st and 2nd digits of the right foot.   Vascular: Pedal pulses are intact and symmetric including the dorsalis pedis and the posterior tibial pulses. Feet are warm to touch.  Chronic nonpitting edema appreciated.  Neurological: Alert and oriented. No acute distress. No sensory impairment bilateral.  Musculoskeletal: Strength is normal for age. No acute deficits appreciated.  Significant hallux valgus bilaterally.  Using a cane.  There is significant ecchymosis base of 2nd and 3rd digits of the right foot.  Tender the toes primarily along the third distal shaft as well as the metatarsal phalangeal joint.  Alignment anatomical of the lesser digits.  X-rays reviewed by myself reveal no evidence of fracture.     Impression: Painful onychomycosis onychocryptosis.  Contusion  right foot.      -Continue wider shoe gear.  Protective padding.    -Seasonal footcare discussed.     -Today's treatment and course of therapy was discussed with the patient in detail. Patient's questions were answered. Proper foot care was discussed. This dictation was done using Dragon computer software and as such may contain grammatical errors.  Patient is standing current findings and course     -Nail debridement was performed this was a greater than 6 nails.  There was slant back procedure performed on the left great toenail medial border.  Well-tolerated.  I recommended Neosporin and bandaging for next several days.  If this fails to resolve or gets any worse please let me know.  Nails were manually debrided. They were decreased and girth in length. Appropriate care was discussed. Preventative care was reviewed. Follow-up in about 2 months unless there is any other problems.    - Review x-rays of the right foot with patient.    -Recommend comfortable shoe gear.  Caution not to reinjure.  If anything worsens with this please let me know.  - No new notes or labs to review

## 2025-08-06 ENCOUNTER — HOSPITAL ENCOUNTER (OUTPATIENT)
Dept: RADIOLOGY | Facility: HOSPITAL | Age: 85
Discharge: HOME | End: 2025-08-06
Payer: MEDICARE

## 2025-08-06 DIAGNOSIS — R91.8 ABNORMAL X-RAY OF LUNG: ICD-10-CM

## 2025-08-06 PROCEDURE — 71046 X-RAY EXAM CHEST 2 VIEWS: CPT | Performed by: RADIOLOGY

## 2025-08-06 PROCEDURE — 71046 X-RAY EXAM CHEST 2 VIEWS: CPT

## 2025-08-19 ENCOUNTER — CLINICAL SUPPORT (OUTPATIENT)
Dept: AUDIOLOGY | Facility: CLINIC | Age: 85
End: 2025-08-19
Payer: MEDICARE

## 2025-08-19 DIAGNOSIS — H90.A31 MIXED CONDUCTIVE AND SENSORINEURAL HEARING LOSS OF RIGHT EAR WITH RESTRICTED HEARING OF LEFT EAR: ICD-10-CM

## 2025-08-19 DIAGNOSIS — H90.A22 SENSORINEURAL HEARING LOSS (SNHL) OF LEFT EAR WITH RESTRICTED HEARING OF RIGHT EAR: Primary | ICD-10-CM
